# Patient Record
Sex: MALE | Race: WHITE | Employment: OTHER | URBAN - METROPOLITAN AREA
[De-identification: names, ages, dates, MRNs, and addresses within clinical notes are randomized per-mention and may not be internally consistent; named-entity substitution may affect disease eponyms.]

---

## 2019-09-19 ENCOUNTER — OFFICE VISIT (OUTPATIENT)
Dept: OPHTHALMOLOGY | Facility: CLINIC | Age: 64
End: 2019-09-19
Payer: COMMERCIAL

## 2019-09-19 DIAGNOSIS — Z01.01 ENCOUNTER FOR EXAMINATION OF EYES AND VISION WITH ABNORMAL FINDINGS: Primary | ICD-10-CM

## 2019-09-19 DIAGNOSIS — H35.371 EPIRETINAL MEMBRANE (ERM) OF RIGHT EYE: ICD-10-CM

## 2019-09-19 DIAGNOSIS — H52.4 PRESBYOPIA: ICD-10-CM

## 2019-09-19 DIAGNOSIS — H43.811 POSTERIOR VITREOUS DETACHMENT OF RIGHT EYE: ICD-10-CM

## 2019-09-19 PROCEDURE — 92004 COMPRE OPH EXAM NEW PT 1/>: CPT | Performed by: OPHTHALMOLOGY

## 2019-09-19 PROCEDURE — 92015 DETERMINE REFRACTIVE STATE: CPT | Performed by: OPHTHALMOLOGY

## 2019-09-19 ASSESSMENT — REFRACTION_WEARINGRX
OS_AXIS: 178
OD_SPHERE: +1.00
OD_ADD: +2.25
OD_CYLINDER: +0.50
OD_ADD: +2.00
SPECS_TYPE: BIFOCAL-LINED
OS_CYLINDER: +0.50
OD_AXIS: 020
OD_CYLINDER: +0.50
OD_SPHERE: +1.25
OS_ADD: +2.00
OD_AXIS: 015
OS_AXIS: 170
OS_SPHERE: +1.00
OS_SPHERE: +1.00
OS_CYLINDER: +0.50
OS_ADD: +2.25
SPECS_TYPE: BIFOCAL-LINED

## 2019-09-19 ASSESSMENT — CUP TO DISC RATIO
OD_RATIO: 0.4
OS_RATIO: 0.4

## 2019-09-19 ASSESSMENT — TONOMETRY
IOP_METHOD: APPLANATION
OS_IOP_MMHG: 18
OD_IOP_MMHG: 18

## 2019-09-19 ASSESSMENT — REFRACTION_MANIFEST
OD_ADD: +2.00
OD_AXIS: 005
OD_SPHERE: +1.50
OS_CYLINDER: +1.00
OS_SPHERE: +1.25
OD_CYLINDER: +0.75
OS_AXIS: 002
OS_ADD: +2.00

## 2019-09-19 ASSESSMENT — CONF VISUAL FIELD
OD_NORMAL: 1
OS_NORMAL: 1

## 2019-09-19 ASSESSMENT — VISUAL ACUITY
CORRECTION_TYPE: GLASSES
OS_CC+: -2
METHOD: SNELLEN - LINEAR
OD_CC: 20/30
OD_CC+: -1
OS_CC: 20/25

## 2019-09-19 ASSESSMENT — EXTERNAL EXAM - LEFT EYE: OS_EXAM: 1+ BROW PTOSIS

## 2019-09-19 ASSESSMENT — SLIT LAMP EXAM - LIDS
COMMENTS: NORMAL
COMMENTS: NORMAL

## 2019-09-19 ASSESSMENT — EXTERNAL EXAM - RIGHT EYE: OD_EXAM: 1+ BROW PTOSIS

## 2019-09-19 NOTE — LETTER
9/19/2019         RE: Mert Kelsey  [address] [place] [address]        Dear Colleague,    Thank you for referring your patient, Mert Kelsey, to the [facility]. Please see a copy of my visit note below.     Current Eye Medications:  Artificial tears both eyes very prn     Subjective:  Here for complete today.  Prior had exams with optometry about two years ago. Has a history of MGD.  Likes less add in the glasses as he likes to see about 18-22 inches from eyes for his computer use.      Objective:  See Ophthalmology Exam.       Assessment:  Baseline eye exam in patient with posterior vitreous detachment right eye.      ICD-10-CM    1. Encounter for examination of eyes and vision with abnormal findings Z01.01    2. Presbyopia H52.4 REFRACTIVE STATUS   3. Posterior vitreous detachment of right eye H43.811 EYE EXAM (SIMPLE-NONBILLABLE)   4. Epiretinal membrane, mild, of right eye H35.371 EYE EXAM (SIMPLE-NONBILLABLE)        Plan:  Glasses Rx given - optional   Possible posterior vitreous detachment (sudden onset large floater and/or flashing lights) left eye discussed.   Use artificial tears up to 4 times daily both eyes. (Refresh Tears, Systane Ultra/Balance, or Theratears)   Call in May 2021 for an appointment in September 2021 for Complete Exam.    Dr. Arshad [phone]        Again, thank you for allowing me to participate in the care of your patient.        Sincerely,        Primo Arshad MD

## 2019-09-19 NOTE — PATIENT INSTRUCTIONS
Glasses Rx given - optional   Possible posterior vitreous detachment (sudden onset large floater and/or flashing lights) left eye discussed.   Use artificial tears up to 4 times daily both eyes. (Refresh Tears, Systane Ultra/Balance, or Theratears)   Call in May 2021 for an appointment in September 2021 for Complete Exam.    Dr. Arshad [phone]

## 2019-09-19 NOTE — PROGRESS NOTES
Current Eye Medications:  Artificial tears both eyes very prn     Subjective:  Here for complete today.  Prior had exams with optometry about two years ago. Has a history of MGD.  Likes less add in the glasses as he likes to see about 18-22 inches from eyes for his computer use.      Objective:  See Ophthalmology Exam.       Assessment:  Baseline eye exam in patient with posterior vitreous detachment right eye.      ICD-10-CM    1. Encounter for examination of eyes and vision with abnormal findings Z01.01    2. Presbyopia H52.4 REFRACTIVE STATUS   3. Posterior vitreous detachment of right eye H43.811 EYE EXAM (SIMPLE-NONBILLABLE)   4. Epiretinal membrane, mild, of right eye H35.371 EYE EXAM (SIMPLE-NONBILLABLE)        Plan:  Glasses Rx given - optional   Possible posterior vitreous detachment (sudden onset large floater and/or flashing lights) left eye discussed.   Use artificial tears up to 4 times daily both eyes. (Refresh Tears, Systane Ultra/Balance, or Theratears)   Call in May 2021 for an appointment in September 2021 for Complete Exam.    Dr. Arshad [phone]

## 2019-09-20 ENCOUNTER — APPOINTMENT (OUTPATIENT)
Dept: OPTOMETRY | Facility: CLINIC | Age: 64
End: 2019-09-20
Payer: COMMERCIAL

## 2019-09-20 PROCEDURE — 92341 FIT SPECTACLES BIFOCAL: CPT | Performed by: OPTOMETRIST

## 2019-11-10 PROBLEM — H35.371 EPIRETINAL MEMBRANE (ERM) OF RIGHT EYE: Status: ACTIVE | Noted: 2019-11-10

## 2019-11-10 PROBLEM — H43.811 POSTERIOR VITREOUS DETACHMENT OF RIGHT EYE: Status: ACTIVE | Noted: 2019-11-10

## 2021-05-30 ENCOUNTER — RECORDS - HEALTHEAST (OUTPATIENT)
Dept: ADMINISTRATIVE | Facility: CLINIC | Age: 66
End: 2021-05-30